# Patient Record
Sex: FEMALE | Race: BLACK OR AFRICAN AMERICAN
[De-identification: names, ages, dates, MRNs, and addresses within clinical notes are randomized per-mention and may not be internally consistent; named-entity substitution may affect disease eponyms.]

---

## 2018-02-26 ENCOUNTER — HOSPITAL ENCOUNTER (INPATIENT)
Dept: HOSPITAL 17 - NEDDLT | Age: 63
LOS: 3 days | Discharge: HOME | DRG: 369 | End: 2018-03-01
Attending: HOSPITALIST | Admitting: HOSPITALIST
Payer: COMMERCIAL

## 2018-02-26 VITALS
DIASTOLIC BLOOD PRESSURE: 85 MMHG | HEART RATE: 120 BPM | TEMPERATURE: 100.3 F | SYSTOLIC BLOOD PRESSURE: 129 MMHG | RESPIRATION RATE: 20 BRPM | OXYGEN SATURATION: 96 %

## 2018-02-26 VITALS — WEIGHT: 123.9 LBS | BODY MASS INDEX: 21.95 KG/M2 | HEIGHT: 63 IN

## 2018-02-26 VITALS — HEART RATE: 115 BPM

## 2018-02-26 DIAGNOSIS — D72.829: ICD-10-CM

## 2018-02-26 DIAGNOSIS — K29.80: ICD-10-CM

## 2018-02-26 DIAGNOSIS — D62: ICD-10-CM

## 2018-02-26 DIAGNOSIS — E87.6: ICD-10-CM

## 2018-02-26 DIAGNOSIS — E88.09: ICD-10-CM

## 2018-02-26 DIAGNOSIS — K52.9: ICD-10-CM

## 2018-02-26 DIAGNOSIS — R00.0: ICD-10-CM

## 2018-02-26 DIAGNOSIS — K29.50: ICD-10-CM

## 2018-02-26 DIAGNOSIS — R79.89: ICD-10-CM

## 2018-02-26 DIAGNOSIS — R15.2: ICD-10-CM

## 2018-02-26 DIAGNOSIS — K22.6: Primary | ICD-10-CM

## 2018-02-26 DIAGNOSIS — I95.9: ICD-10-CM

## 2018-02-26 DIAGNOSIS — K76.0: ICD-10-CM

## 2018-02-26 DIAGNOSIS — I10: ICD-10-CM

## 2018-02-26 DIAGNOSIS — F17.210: ICD-10-CM

## 2018-02-26 PROCEDURE — 82948 REAGENT STRIP/BLOOD GLUCOSE: CPT

## 2018-02-26 PROCEDURE — 85610 PROTHROMBIN TIME: CPT

## 2018-02-26 PROCEDURE — 85384 FIBRINOGEN ACTIVITY: CPT

## 2018-02-26 PROCEDURE — 87641 MR-STAPH DNA AMP PROBE: CPT

## 2018-02-26 PROCEDURE — 36430 TRANSFUSION BLD/BLD COMPNT: CPT

## 2018-02-26 PROCEDURE — 71045 X-RAY EXAM CHEST 1 VIEW: CPT

## 2018-02-26 PROCEDURE — 82550 ASSAY OF CK (CPK): CPT

## 2018-02-26 PROCEDURE — 85730 THROMBOPLASTIN TIME PARTIAL: CPT

## 2018-02-26 PROCEDURE — 85014 HEMATOCRIT: CPT

## 2018-02-26 PROCEDURE — 80053 COMPREHEN METABOLIC PANEL: CPT

## 2018-02-26 PROCEDURE — 85018 HEMOGLOBIN: CPT

## 2018-02-26 PROCEDURE — 85027 COMPLETE CBC AUTOMATED: CPT

## 2018-02-26 PROCEDURE — 86920 COMPATIBILITY TEST SPIN: CPT

## 2018-02-26 PROCEDURE — 84132 ASSAY OF SERUM POTASSIUM: CPT

## 2018-02-26 PROCEDURE — 83605 ASSAY OF LACTIC ACID: CPT

## 2018-02-26 PROCEDURE — 84100 ASSAY OF PHOSPHORUS: CPT

## 2018-02-26 PROCEDURE — C9113 INJ PANTOPRAZOLE SODIUM, VIA: HCPCS

## 2018-02-26 PROCEDURE — 88305 TISSUE EXAM BY PATHOLOGIST: CPT

## 2018-02-26 PROCEDURE — 85025 COMPLETE CBC W/AUTO DIFF WBC: CPT

## 2018-02-26 PROCEDURE — 80048 BASIC METABOLIC PNL TOTAL CA: CPT

## 2018-02-26 PROCEDURE — 88312 SPECIAL STAINS GROUP 1: CPT

## 2018-02-26 PROCEDURE — 83735 ASSAY OF MAGNESIUM: CPT

## 2018-02-26 PROCEDURE — P9016 RBC LEUKOCYTES REDUCED: HCPCS

## 2018-02-27 VITALS
TEMPERATURE: 99.3 F | SYSTOLIC BLOOD PRESSURE: 116 MMHG | HEART RATE: 86 BPM | DIASTOLIC BLOOD PRESSURE: 59 MMHG | RESPIRATION RATE: 20 BRPM | OXYGEN SATURATION: 96 %

## 2018-02-27 VITALS
SYSTOLIC BLOOD PRESSURE: 132 MMHG | OXYGEN SATURATION: 96 % | HEART RATE: 105 BPM | DIASTOLIC BLOOD PRESSURE: 81 MMHG | RESPIRATION RATE: 20 BRPM

## 2018-02-27 VITALS
HEART RATE: 112 BPM | OXYGEN SATURATION: 94 % | RESPIRATION RATE: 29 BRPM | DIASTOLIC BLOOD PRESSURE: 82 MMHG | SYSTOLIC BLOOD PRESSURE: 131 MMHG

## 2018-02-27 VITALS
DIASTOLIC BLOOD PRESSURE: 75 MMHG | RESPIRATION RATE: 13 BRPM | OXYGEN SATURATION: 97 % | HEART RATE: 101 BPM | SYSTOLIC BLOOD PRESSURE: 129 MMHG

## 2018-02-27 VITALS
SYSTOLIC BLOOD PRESSURE: 116 MMHG | OXYGEN SATURATION: 95 % | HEART RATE: 115 BPM | RESPIRATION RATE: 20 BRPM | DIASTOLIC BLOOD PRESSURE: 74 MMHG

## 2018-02-27 VITALS — HEART RATE: 93 BPM | OXYGEN SATURATION: 96 % | RESPIRATION RATE: 27 BRPM

## 2018-02-27 VITALS — RESPIRATION RATE: 12 BRPM | HEART RATE: 84 BPM | OXYGEN SATURATION: 97 %

## 2018-02-27 VITALS
DIASTOLIC BLOOD PRESSURE: 73 MMHG | HEART RATE: 109 BPM | RESPIRATION RATE: 21 BRPM | OXYGEN SATURATION: 97 % | SYSTOLIC BLOOD PRESSURE: 134 MMHG

## 2018-02-27 VITALS
OXYGEN SATURATION: 99 % | HEART RATE: 99 BPM | SYSTOLIC BLOOD PRESSURE: 125 MMHG | RESPIRATION RATE: 27 BRPM | DIASTOLIC BLOOD PRESSURE: 69 MMHG

## 2018-02-27 VITALS
HEART RATE: 87 BPM | SYSTOLIC BLOOD PRESSURE: 117 MMHG | OXYGEN SATURATION: 96 % | DIASTOLIC BLOOD PRESSURE: 60 MMHG | RESPIRATION RATE: 13 BRPM

## 2018-02-27 VITALS — HEART RATE: 94 BPM | RESPIRATION RATE: 54 BRPM | OXYGEN SATURATION: 95 %

## 2018-02-27 VITALS
SYSTOLIC BLOOD PRESSURE: 132 MMHG | DIASTOLIC BLOOD PRESSURE: 66 MMHG | HEART RATE: 107 BPM | OXYGEN SATURATION: 95 % | RESPIRATION RATE: 17 BRPM

## 2018-02-27 VITALS
DIASTOLIC BLOOD PRESSURE: 69 MMHG | RESPIRATION RATE: 19 BRPM | SYSTOLIC BLOOD PRESSURE: 145 MMHG | HEART RATE: 98 BPM | OXYGEN SATURATION: 95 %

## 2018-02-27 VITALS
DIASTOLIC BLOOD PRESSURE: 78 MMHG | RESPIRATION RATE: 27 BRPM | HEART RATE: 116 BPM | OXYGEN SATURATION: 100 % | SYSTOLIC BLOOD PRESSURE: 132 MMHG

## 2018-02-27 VITALS
DIASTOLIC BLOOD PRESSURE: 70 MMHG | SYSTOLIC BLOOD PRESSURE: 135 MMHG | HEART RATE: 103 BPM | OXYGEN SATURATION: 96 % | RESPIRATION RATE: 15 BRPM

## 2018-02-27 VITALS
DIASTOLIC BLOOD PRESSURE: 71 MMHG | RESPIRATION RATE: 19 BRPM | SYSTOLIC BLOOD PRESSURE: 125 MMHG | OXYGEN SATURATION: 98 % | HEART RATE: 95 BPM

## 2018-02-27 VITALS
DIASTOLIC BLOOD PRESSURE: 66 MMHG | HEART RATE: 100 BPM | RESPIRATION RATE: 19 BRPM | SYSTOLIC BLOOD PRESSURE: 123 MMHG | OXYGEN SATURATION: 96 %

## 2018-02-27 VITALS
HEART RATE: 109 BPM | RESPIRATION RATE: 16 BRPM | OXYGEN SATURATION: 95 % | DIASTOLIC BLOOD PRESSURE: 72 MMHG | SYSTOLIC BLOOD PRESSURE: 122 MMHG

## 2018-02-27 VITALS
SYSTOLIC BLOOD PRESSURE: 124 MMHG | OXYGEN SATURATION: 95 % | HEART RATE: 99 BPM | RESPIRATION RATE: 20 BRPM | DIASTOLIC BLOOD PRESSURE: 71 MMHG

## 2018-02-27 VITALS — HEART RATE: 100 BPM | OXYGEN SATURATION: 97 % | RESPIRATION RATE: 19 BRPM

## 2018-02-27 VITALS — RESPIRATION RATE: 14 BRPM | HEART RATE: 93 BPM | OXYGEN SATURATION: 96 %

## 2018-02-27 VITALS — RESPIRATION RATE: 19 BRPM | HEART RATE: 95 BPM | OXYGEN SATURATION: 97 %

## 2018-02-27 VITALS
OXYGEN SATURATION: 98 % | SYSTOLIC BLOOD PRESSURE: 112 MMHG | RESPIRATION RATE: 25 BRPM | HEART RATE: 82 BPM | DIASTOLIC BLOOD PRESSURE: 59 MMHG

## 2018-02-27 VITALS — HEART RATE: 86 BPM | DIASTOLIC BLOOD PRESSURE: 57 MMHG | SYSTOLIC BLOOD PRESSURE: 99 MMHG | RESPIRATION RATE: 30 BRPM

## 2018-02-27 VITALS
DIASTOLIC BLOOD PRESSURE: 67 MMHG | SYSTOLIC BLOOD PRESSURE: 128 MMHG | OXYGEN SATURATION: 96 % | HEART RATE: 102 BPM | RESPIRATION RATE: 27 BRPM

## 2018-02-27 VITALS — RESPIRATION RATE: 22 BRPM | OXYGEN SATURATION: 96 % | HEART RATE: 106 BPM

## 2018-02-27 VITALS
RESPIRATION RATE: 19 BRPM | HEART RATE: 79 BPM | SYSTOLIC BLOOD PRESSURE: 107 MMHG | DIASTOLIC BLOOD PRESSURE: 59 MMHG | OXYGEN SATURATION: 99 %

## 2018-02-27 VITALS — HEART RATE: 96 BPM | RESPIRATION RATE: 33 BRPM | OXYGEN SATURATION: 100 %

## 2018-02-27 VITALS
RESPIRATION RATE: 14 BRPM | OXYGEN SATURATION: 97 % | DIASTOLIC BLOOD PRESSURE: 69 MMHG | HEART RATE: 97 BPM | SYSTOLIC BLOOD PRESSURE: 133 MMHG

## 2018-02-27 VITALS — RESPIRATION RATE: 26 BRPM | HEART RATE: 96 BPM

## 2018-02-27 VITALS
RESPIRATION RATE: 17 BRPM | SYSTOLIC BLOOD PRESSURE: 153 MMHG | HEART RATE: 105 BPM | OXYGEN SATURATION: 95 % | DIASTOLIC BLOOD PRESSURE: 72 MMHG

## 2018-02-27 VITALS
DIASTOLIC BLOOD PRESSURE: 79 MMHG | OXYGEN SATURATION: 95 % | HEART RATE: 109 BPM | SYSTOLIC BLOOD PRESSURE: 123 MMHG | RESPIRATION RATE: 24 BRPM

## 2018-02-27 VITALS — OXYGEN SATURATION: 97 % | RESPIRATION RATE: 21 BRPM | HEART RATE: 99 BPM

## 2018-02-27 VITALS
DIASTOLIC BLOOD PRESSURE: 74 MMHG | HEART RATE: 109 BPM | OXYGEN SATURATION: 95 % | RESPIRATION RATE: 15 BRPM | SYSTOLIC BLOOD PRESSURE: 126 MMHG

## 2018-02-27 VITALS
SYSTOLIC BLOOD PRESSURE: 107 MMHG | DIASTOLIC BLOOD PRESSURE: 58 MMHG | RESPIRATION RATE: 33 BRPM | OXYGEN SATURATION: 99 % | HEART RATE: 92 BPM

## 2018-02-27 VITALS
RESPIRATION RATE: 25 BRPM | HEART RATE: 110 BPM | SYSTOLIC BLOOD PRESSURE: 132 MMHG | OXYGEN SATURATION: 94 % | DIASTOLIC BLOOD PRESSURE: 76 MMHG

## 2018-02-27 VITALS
SYSTOLIC BLOOD PRESSURE: 124 MMHG | OXYGEN SATURATION: 94 % | HEART RATE: 106 BPM | RESPIRATION RATE: 16 BRPM | DIASTOLIC BLOOD PRESSURE: 69 MMHG

## 2018-02-27 VITALS
SYSTOLIC BLOOD PRESSURE: 126 MMHG | OXYGEN SATURATION: 100 % | DIASTOLIC BLOOD PRESSURE: 73 MMHG | HEART RATE: 118 BPM | RESPIRATION RATE: 26 BRPM

## 2018-02-27 VITALS
RESPIRATION RATE: 15 BRPM | TEMPERATURE: 99.1 F | SYSTOLIC BLOOD PRESSURE: 137 MMHG | OXYGEN SATURATION: 93 % | DIASTOLIC BLOOD PRESSURE: 74 MMHG | HEART RATE: 107 BPM

## 2018-02-27 VITALS
DIASTOLIC BLOOD PRESSURE: 56 MMHG | OXYGEN SATURATION: 96 % | HEART RATE: 91 BPM | RESPIRATION RATE: 22 BRPM | TEMPERATURE: 99 F | SYSTOLIC BLOOD PRESSURE: 117 MMHG

## 2018-02-27 VITALS — HEART RATE: 117 BPM

## 2018-02-27 LAB
ALBUMIN SERPL-MCNC: 2.9 GM/DL (ref 3.4–5)
ALP SERPL-CCNC: 87 U/L (ref 45–117)
ALT SERPL-CCNC: 16 U/L (ref 10–53)
AST SERPL-CCNC: 20 U/L (ref 15–37)
BASOPHILS # BLD AUTO: 0.1 TH/MM3 (ref 0–0.2)
BASOPHILS NFR BLD: 0.4 % (ref 0–2)
BILIRUB SERPL-MCNC: 0.4 MG/DL (ref 0.2–1)
BUN SERPL-MCNC: 37 MG/DL (ref 7–18)
CALCIUM SERPL-MCNC: 7.7 MG/DL (ref 8.5–10.1)
CHLORIDE SERPL-SCNC: 111 MEQ/L (ref 98–107)
CREAT SERPL-MCNC: 0.87 MG/DL (ref 0.5–1)
EOSINOPHIL # BLD: 0 TH/MM3 (ref 0–0.4)
EOSINOPHIL NFR BLD: 0.1 % (ref 0–4)
ERYTHROCYTE [DISTWIDTH] IN BLOOD BY AUTOMATED COUNT: 12.6 % (ref 11.6–17.2)
ERYTHROCYTE [DISTWIDTH] IN BLOOD BY AUTOMATED COUNT: 13 % (ref 11.6–17.2)
FIBRINOGEN PPP-MCNC: 229 MG/DL (ref 227–377)
GFR SERPLBLD BASED ON 1.73 SQ M-ARVRAT: 80 ML/MIN (ref 89–?)
GLUCOSE SERPL-MCNC: 140 MG/DL (ref 74–106)
HCO3 BLD-SCNC: 23.1 MEQ/L (ref 21–32)
HCT VFR BLD CALC: 21.3 % (ref 35–46)
HCT VFR BLD CALC: 23.3 % (ref 35–46)
HCT VFR BLD CALC: 24.4 % (ref 35–46)
HCT VFR BLD CALC: 27.5 % (ref 35–46)
HGB BLD-MCNC: 7.3 GM/DL (ref 11.6–15.3)
HGB BLD-MCNC: 8 GM/DL (ref 11.6–15.3)
HGB BLD-MCNC: 8.5 GM/DL (ref 11.6–15.3)
HGB BLD-MCNC: 9.5 GM/DL (ref 11.6–15.3)
INR PPP: 1 RATIO
LYMPHOCYTES # BLD AUTO: 3.5 TH/MM3 (ref 1–4.8)
LYMPHOCYTES NFR BLD AUTO: 23.4 % (ref 9–44)
MAGNESIUM SERPL-MCNC: 1.6 MG/DL (ref 1.5–2.5)
MAGNESIUM SERPL-MCNC: 1.7 MG/DL (ref 1.5–2.5)
MCH RBC QN AUTO: 32.5 PG (ref 27–34)
MCH RBC QN AUTO: 32.8 PG (ref 27–34)
MCHC RBC AUTO-ENTMCNC: 34.6 % (ref 32–36)
MCHC RBC AUTO-ENTMCNC: 34.6 % (ref 32–36)
MCV RBC AUTO: 93.8 FL (ref 80–100)
MCV RBC AUTO: 94.7 FL (ref 80–100)
MONOCYTE #: 0.6 TH/MM3 (ref 0–0.9)
MONOCYTES NFR BLD: 4 % (ref 0–8)
NEUTROPHILS # BLD AUTO: 10.9 TH/MM3 (ref 1.8–7.7)
NEUTROPHILS NFR BLD AUTO: 72.1 % (ref 16–70)
PHOSPHATE SERPL-MCNC: 2.3 MG/DL (ref 2.5–4.9)
PHOSPHATE SERPL-MCNC: 2.9 MG/DL (ref 2.5–4.9)
PLATELET # BLD: 243 TH/MM3 (ref 150–450)
PLATELET # BLD: 283 TH/MM3 (ref 150–450)
PMV BLD AUTO: 9.3 FL (ref 7–11)
PMV BLD AUTO: 9.5 FL (ref 7–11)
PROT SERPL-MCNC: 6.1 GM/DL (ref 6.4–8.2)
PROTHROMBIN TIME: 10.5 SEC (ref 9.8–11.6)
RBC # BLD AUTO: 2.58 MIL/MM3 (ref 4–5.3)
RBC # BLD AUTO: 2.93 MIL/MM3 (ref 4–5.3)
SODIUM SERPL-SCNC: 145 MEQ/L (ref 136–145)
WBC # BLD AUTO: 12 TH/MM3 (ref 4–11)
WBC # BLD AUTO: 15.1 TH/MM3 (ref 4–11)

## 2018-02-27 PROCEDURE — 0W3P8ZZ CONTROL BLEEDING IN GASTROINTESTINAL TRACT, VIA NATURAL OR ARTIFICIAL OPENING ENDOSCOPIC: ICD-10-PCS | Performed by: INTERNAL MEDICINE

## 2018-02-27 PROCEDURE — 0DB78ZX EXCISION OF STOMACH, PYLORUS, VIA NATURAL OR ARTIFICIAL OPENING ENDOSCOPIC, DIAGNOSTIC: ICD-10-PCS | Performed by: INTERNAL MEDICINE

## 2018-02-27 RX ADMIN — PANTOPRAZOLE SODIUM SCH MLS/HR: 40 INJECTION, POWDER, FOR SOLUTION INTRAVENOUS at 09:13

## 2018-02-27 RX ADMIN — Medication SCH ML: at 20:02

## 2018-02-27 RX ADMIN — CHLORHEXIDINE GLUCONATE SCH PACK: 500 CLOTH TOPICAL at 01:52

## 2018-02-27 RX ADMIN — STANDARDIZED SENNA CONCENTRATE AND DOCUSATE SODIUM SCH TAB: 8.6; 5 TABLET, FILM COATED ORAL at 09:00

## 2018-02-27 RX ADMIN — POTASSIUM CHLORIDE SCH MLS/HR: 10 INJECTION, SOLUTION INTRAVENOUS at 15:02

## 2018-02-27 RX ADMIN — PHENYTOIN SODIUM SCH MLS/HR: 50 INJECTION INTRAMUSCULAR; INTRAVENOUS at 01:51

## 2018-02-27 RX ADMIN — HUMAN INSULIN SCH: 100 INJECTION, SOLUTION SUBCUTANEOUS at 12:00

## 2018-02-27 RX ADMIN — MAGNESIUM SULFATE IN DEXTROSE SCH MLS/HR: 10 INJECTION, SOLUTION INTRAVENOUS at 17:13

## 2018-02-27 RX ADMIN — PHENYTOIN SODIUM SCH MLS/HR: 50 INJECTION INTRAMUSCULAR; INTRAVENOUS at 22:09

## 2018-02-27 RX ADMIN — PANTOPRAZOLE SODIUM SCH MLS/HR: 40 INJECTION, POWDER, FOR SOLUTION INTRAVENOUS at 20:02

## 2018-02-27 RX ADMIN — MAGNESIUM SULFATE IN DEXTROSE SCH MLS/HR: 10 INJECTION, SOLUTION INTRAVENOUS at 09:13

## 2018-02-27 RX ADMIN — HUMAN INSULIN SCH: 100 INJECTION, SOLUTION SUBCUTANEOUS at 20:02

## 2018-02-27 RX ADMIN — HUMAN INSULIN SCH: 100 INJECTION, SOLUTION SUBCUTANEOUS at 17:00

## 2018-02-27 RX ADMIN — PHENYTOIN SODIUM SCH MLS/HR: 50 INJECTION INTRAMUSCULAR; INTRAVENOUS at 09:13

## 2018-02-27 RX ADMIN — Medication SCH ML: at 09:15

## 2018-02-27 RX ADMIN — STANDARDIZED SENNA CONCENTRATE AND DOCUSATE SODIUM SCH TAB: 8.6; 5 TABLET, FILM COATED ORAL at 20:02

## 2018-02-27 RX ADMIN — POTASSIUM CHLORIDE SCH MLS/HR: 10 INJECTION, SOLUTION INTRAVENOUS at 09:14

## 2018-02-27 RX ADMIN — CHLORHEXIDINE GLUCONATE SCH PACK: 500 CLOTH TOPICAL at 22:10

## 2018-02-27 RX ADMIN — POTASSIUM CHLORIDE SCH MLS/HR: 10 INJECTION, SOLUTION INTRAVENOUS at 13:18

## 2018-02-27 RX ADMIN — PHENYTOIN SODIUM SCH MLS/HR: 50 INJECTION INTRAMUSCULAR; INTRAVENOUS at 14:31

## 2018-02-27 NOTE — HHI.HP
HPI


Service


Critical Care Medicine


Primary Care Physician


No Primary Care Physician


Admission Diagnosis





Diagnosis:  


Travel History


International Travel<30 Days:  No


Contact w/Intl Traveler <30 Da:  No


Traveled to Known Affected Are:  No


History of Present Illness


62-year-old female with a history of hypertension and one episode of presumed 

upper GI bleed noted as hematemesis approximately years ago that was self-

limited, patient did not seek medical attention for, presents with having 

vomiting copious amounts of dark material. The patient surmises as blood, 

similar to previous episode noted above.  At presentation at the [facility] 

emergency department, patient was tachycardic with hypotension in triage.  

During my assessment in the ICU the patient still remained slightly tachycardic 

but normotensive with map at 100s .  The patient denies having chest pain, 

shortness of breath, abdominal pain, having any melena or hematochezia 

recently.  Patient denies fever chills sweats, recent antibiotic use, or any 

recent illnesses.  Patient denies any history of any bleeding dyscrasias or 

family history of same.  Patient does not take any blood thinners does not use 

NSAIDs on a regular basis.  Patient states she infrequently has 1 drink of 

alcohol.





Review of Systems


Constitutional:  DENIES: Diaphoretic episodes, Fatigue, Fever, Weight gain, 

Weight loss, Chills, Dizziness, Change in appetite, Night Sweats


Endocrine:  DENIES: Abnorml menstrual pattern, Heat/cold intolerance, Polydipsia

, Polyuria, Polyphagia


Eyes:  DENIES: Blurred vision, Diplopia, Eye inflammation, Eye pain, Vision loss

, Photosensitivity, Double Vision


Ears, nose, mouth, throat:  DENIES: Tinnitus, Hearing loss, Vertigo, Nasal 

discharge, Oral lesions, Throat pain, Hoarseness, Ear Pain, Running Nose, 

Epistaxis, Sinus Pain, Toothache, Odynophagia


Respiratory:  DENIES: Apneas, Cough, Snoring, Wheezing, Hemoptysis, Sputum 

production, Shortness of breath


Cardiovascular:  DENIES: Chest pain, Palpitations, Syncope, Dyspnea on Exertion

, PND, Lower Extremity Edema, Orthopnea, Claudication


Gastrointestinal:  COMPLAINS OF: Abdominal pain, Nausea, Vomiting, DENIES: 

Black stools, Bloody stools, Constipation, Diarrhea, Difficulty Swallowing, 

Anorexia


Genitourinary:  DENIES: Abnormal vaginal bleeding, Dysmenorrhea, Dyspareunia, 

Sexual dysfunction, Urinary frequency, Urinary incontinence, Urgency, Hematuria

, Dysuria, Nocturia, Vaginal discharge


Musculoskeletal:  DENIES: Joint pain, Muscle aches, Stiffness, Joint Swelling, 

Back pain, Neck pain


Integumentary:  DENIES: Abnormal pigmentation, Pruritus, Rash, Nail changes, 

Breast masses, Breast skin changes, Nipple discharge


Hematologic/lymphatic:  DENIES: Bruising, Lymphadenopathy


Immunologic/allergic:  DENIES: Eczema, Urticaria


Neurologic:  DENIES: Abnormal gait, Headache, Localized weakness, Paresthesias, 

Seizures, Speech Problems, Tremor, Poor Balance


Psychiatric:  DENIES: Anxiety, Confusion, Mood changes, Depression, 

Hallucinations, Agitation, Suicidal Ideation, Homicidal Ideation, Delusions





Past Family Social History


Allergies:  


Coded Allergies:  


     No Known Drug Allergies (Verified  Allergy, Unknown, 18)


Past Medical History


Hypertension:  Yes


Menopausal:  Yes


Past Surgical History


Tubal Ligation:  Yes


Reported Medications





Reported Meds & Active Scripts


Active


Reported


[BP MED x2]


Active Ordered Medications





Current Medications








 Medications


  (Trade)  Dose


 Ordered  Sig/Rishi


 Route


 PRN Reason  Start Time


 Stop Time Status Last Admin


Dose Admin


 


 Sodium Chloride  1,000 ml @ 


 184 mls/hr  Q5H27M


 IV


   18 00:48


    18 01:51


 


 


 Sodium Chloride


  (NS Flush)  2 ml  UNSCH  PRN


 IV FLUSH


 FLUSH AFTER USING IV ACCESS  18 01:00


     


 


 


 Sodium Chloride


  (NS Flush)  2 ml  BID


 IV FLUSH


   18 09:00


     


 


 


 Acetaminophen


  (Tylenol)  650 mg  Q6H  PRN


 PO


 PAIN 1-5 AND/OR FEVER >101F  18 01:00


     


 


 


 Hydromorphone HCl


  (Dilaudid Pf Inj)  1 mg  Q4H  PRN


 IV


 PAIN SCALE 6 TO 10  18 01:00


     


 


 


 Pantoprazole


 Sodium


  (Protonix)  40 mg  Q12HR


 PO


   18 01:00


    18 01:51


 


 


 Ondansetron HCl


  (Zofran Inj)  4 mg  Q6H  PRN


 IV PUSH


 NAUSEA OR VOMITING  18 01:00


     


 


 


 Temazepam


  (Restoril)  15 mg  HS  PRN


 PO


 INSOMNIA  18 01:00


     


 


 


 Albuterol/


 Ipratropium


  (Duoneb Neb)  1 ampule  Q2HR NEB  PRN


 INH


 WHEEZING  18 01:00


     


 


 


 Miscellaneous


 Information  1  Q361D


 XX


   18 01:00


    18 01:00


 


 


 Chlorhexidine


 Gluconate


  (Chlorhexidine


 2% Cloth)  3 pack


 Taper  DAILY@04


 TOP


   18 04:00


 19 03:59  18 01:52


 


 


 Chlorhexidine


 Gluconate


  (Chlorhexidine


 2% Cloth)  3 pack  UNSCH  PRN


 TOP


 HYGIENIC CARE  18 01:00


     


 


 


 Senna/Docusate


 Sodium


  (Carla-Colace)  1 tab  BID


 PO


   18 09:00


     


 


 


 Magnesium


 Hydroxide


  (Milk Of


 Magnesia Liq)  30 ml  Q12H  PRN


 PO


 Mild constipation  18 01:00


     


 


 


 Sennosides


  (Senokot)  17.2 mg  Q12H  PRN


 PO


 Moderate constipation  18 01:00


     


 


 


 Bisacodyl


  (Dulcolax Supp)  10 mg  DAILY  PRN


 RECTAL


 SEVERE CONSITIPATION  18 01:00


     


 


 


 Lactulose


  (Lactulose Liq)  30 ml  DAILY  PRN


 PO


 SEVERE CONSITIPATION  18 01:00


     


 








Family History


No family history significant of malignancy


Social History


Alcohol Use:  Yes (OCCATIONALLY)


Tobacco Use:  Yes (<1PPD)


Substance Use: Yes, cocaine occasionally





Physical Exam


Vital Signs





Vital Signs








  Date Time  Temp Pulse Resp B/P (MAP) Pulse Ox O2 Delivery O2 Flow Rate FiO2


 


18 23:15  115      


 


18 22:50 100.3 120 20 129/85 (100) 96   








Physical Exam


GENERAL: Well-nourished, well-developed patient.


SKIN: Warm and dry.


HEAD: Normocephalic.


EYES: No scleral icterus. No injection or drainage. 


NECK: Supple, trachea midline. No JVD or lymphadenopathy.


CARDIOVASCULAR: Regular rate and rhythm without murmurs, gallops, or rubs. 


RESPIRATORY: Breath sounds equal bilaterally. No accessory muscle use.


GASTROINTESTINAL: Abdomen soft, non-tender, nondistended. 


MUSCULOSKELETAL: No cyanosis, or edema. 


BACK: Nontender without obvious deformity. 


NEURO EXAM:


GCS: 15


Mental Status: The patient is alert and oriented to person, place, and time 

with normal speech.





Septic Shock Reassessment


Septic shock perfusion:  reassessment completed





Caprini VTE Risk Assessment


Caprini VTE Risk Assessment:  Mod/High Risk (score >= 2)


VTE Pharm Contraindication:  Hemorrhage


Caprini Risk Assessment Model











 Point Value = 1          Point Value = 2  Point Value = 3  Point Value = 5


 


Age 41-60


Minor surgery


BMI > 25 kg/m2


Swollen legs


Varicose veins


Pregnancy or postpartum


History of unexplained or recurrent


   spontaneous 


Oral contraceptives or hormone


   replacement


Sepsis (< 1 month)


Serious lung disease, including


   pneumonia (< 1 month)


Abnormal pulmonary function


Acute myocardial infarction


Congestive heart failure (< 1 month)


History of inflammatory bowel disease


Medical patient at bed rest Age 61-74


Arthroscopic surgery


Major open surgery (> 45 min)


Laparoscopic surgery (> 45 min)


Malignancy


Confined to bed (> 72 hours)


Immobilizing plaster cast


Central venous access Age >= 75


History of VTE


Family history of VTE


Factor V Leiden


Prothrombin 73043W


Lupus anticoagulant


Anticardiolipin antibodies


Elevated serum homocysteine


Heparin-induced thrombocytopenia


Other congenital or acquired


   thrombophilia Stroke (< 1 month)


Elective arthroplasty


Hip, pelvis, or leg fracture


Acute spinal cord injury (< 1 month)








Prophylaxis Regimen











   Total Risk


Factor Score Risk Level Prophylaxis Regimen


 


0-1      Low Early ambulation


 


2 Moderate Order ONE of the following:


*Sequential Compression Device (SCD)


*Heparin 5000 units SQ BID


 


3-4 Higher Order ONE of the following medications:


*Heparin 5000 units SQ TID


*Enoxaparin/Lovenox 40 mg SQ daily (WT < 150 kg, CrCl > 30 mL/min)


*Enoxaparin/Lovenox 30 mg SQ daily (WT < 150 kg, CrCl > 10-29 mL/min)


*Enoxaparin/Lovenox 30 mg SQ BID (WT < 150 kg, CrCl > 30 mL/min)


AND/OR


*Sequential Compression Device (SCD)


 


5 or more Highest Order ONE of the following medications:


*Heparin 5000 units SQ TID (Preferred with Epidurals)


*Enoxaparin/Lovenox 40 mg SQ daily (WT < 150 kg, CrCl > 30 mL/min)


*Enoxaparin/Lovenox 30 mg SQ daily (WT < 150 kg, CrCl > 10-29 mL/min)


*Enoxaparin/Lovenox 30 mg SQ BID (WT < 150 kg, CrCl > 30 mL/min)


AND


*Sequential Compression Device (SCD)











Assessment and Plan


Assessment and Plan


Hematemesis


- Monitor H&H


- Protonix 40 IV twice a day


- Gastroenterology consultation


- IV fluids resuscitation


- ICU and telemetry





Hypotension


- Aggressive IV fluids resuscitation





Tachycardia


- Positive for cocaine


- Telemetry and supportive care





DVT GI prophylaxis


- Teds SCDs


- No pharmacological DVT prophylaxis due to upper GI bleed


- Protonix IV twice a day





Critical Care:


The total critical care time was 35 minutes. Time to perform other separately 

billable procedures was not included in the critical care time.











Jaden Parada MD 2018 12:57 am

## 2018-02-27 NOTE — RADRPT
EXAM DATE/TIME:  02/27/2018 09:23 

 

[facility] COMPARISON:     

No previous studies available for comparison.

 

                     

INDICATIONS :     

Hematemesis.

                     

 

MEDICAL HISTORY :     

Hypertension.          

 

SURGICAL HISTORY :     

Tubal ligation.   

 

ENCOUNTER:     

Initial                                        

 

ACUITY:     

1 day      

 

PAIN SCORE:     

0/10

 

LOCATION:     

Bilateral chest 

 

FINDINGS:     

Portable AP view of the chest demonstrates a normal-sized cardiac silhouette. Nasogastric tube tip is
 in the stomach. Lungs are underinflated with mild atelectasis at the bases. No effusion, consolidati
on, or pneumothorax is identified. Bones and soft tissues demonstrate no acute finding.

 

CONCLUSION:     

No acute cardiopulmonary abnormality is identified.

 

 

 

 Daniel Bee MD on February 27, 2018 at 9:58           

Board Certified Radiologist.

 This report was verified electronically.
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
## 2018-02-27 NOTE — HHI.CCPN
Subjective


Remarks/Hospital Course


62-year-old female with a history of hypertension and one episode of presumed 

upper GI bleed noted as hematemesis approximately years ago that was self-

limited, patient did not seek medical attention for, presents with having 

vomiting copious amounts of dark material. The patient surmises as blood, 

similar to previous episode noted above.  At presentation at the [facility] 

emergency department, patient was tachycardic with hypotension in triage.  

During my assessment in the ICU the patient still remained slightly tachycardic 

but normotensive with map at 100s .  The patient denies having chest pain, 

shortness of breath, abdominal pain, having any melena or hematochezia 

recently.  Patient denies fever chills sweats, recent antibiotic use, or any 

recent illnesses.  Patient denies any history of any bleeding dyscrasias or 

family history of same.  Patient does not take any blood thinners does not use 

NSAIDs on a regular basis.  Patient states she infrequently has 1 drink of 

alcohol.





Subjective





2/27: Afebrile.  Denies abdominal pain.  No further episodes of hematemesis.  

Currently n.p.o. for possible EGD today.





Objective





Vital Signs








  Date Time  Temp Pulse Resp B/P (MAP) Pulse Ox O2 Delivery O2 Flow Rate FiO2


 


2/27/18 06:00  108      


 


2/27/18 06:00   22  96   


 


2/27/18 05:40    128/67 (87)    


 


2/27/18 04:00 99.1       














Intake and Output   


 


 2/27/18 2/27/18 2/28/18





 08:00 16:00 00:00


 


Intake Total 30 ml  


 


Balance 30 ml  








Result Diagram:  


2/27/18 0144                                                                   

             2/27/18 0144





Objective Remarks


GENERAL: 62-year-old AA female currently resting in bed in no acute distress


SKIN: Warm and dry.


HEAD: Atraumatic. Normocephalic. 


EYES: Pupils equal and round about 3 mm bilaterally and reactive. No scleral 

icterus. No injection or drainage. 


ENT: No nasal bleeding or discharge.  Mucous membranes pink and moist.  NG tube 

in left nares.


NECK: Trachea midline. No JVD. 


CARDIOVASCULAR: Tachycardic, RR.  S1, S2.  No S4.  No murmur


RESPIRATORY: Clear to auscultation bilaterally without wheezes rales or rhonchi


GASTROINTESTINAL: Abdomen soft, non-tender, nondistended.  Hypoactive bowel 

sounds are appreciated. 


MUSCULOSKELETAL: Extremities without any significant peripheral edema. No 

obvious deformities. 


NEUROLOGICAL: Awake and alert. No obvious cranial nerve deficits.  Motor 

grossly within normal limits. Five out of 5 muscle strength in the arms and 

legs.  Normal speech.


PSYCHIATRIC: Appropriate mood and affect; insight and judgment normal.


Urinary Catheter:  No


Assessment to:  Continue


Vascular Central Line Catheter:  No


Assessment to:  Continue





A/P


Assessment and Plan


Neuro/Psych:





UDS + cocaine 2/26/18


 


Ofirmev 1 g IV every 8 hours as needed fever/pain 1 through 5


Hydromorphone 1 mg IV every 4 hours as needed pain 6-10


Denies EtOH use





CV: 





Sinus tachycardia


History of hypertension





Avoid beta-blockers acutely -UDS positive for cocaine


Currently on normal saline at 100 cc an hour


Not requiring vasopressors and/or antihypertensives currently


Follow-up on EKG





Resp: 





Nasal cannula to make to maintain saturations greater than or equal to 92%.  

Currently in room air


Incentives parameter while awake


Check chest x-ray





GI: 





Hematemesis


Hypoalbuminemia 





Currently n.p.o.


Pantoprazole drip currently at 8 mg an hour for GI prophylaxis he received 80 

mg bolus 1


Currently docusate sodium/senna 1 tablet twice daily for bowel regimen





:  





No indication for Guidry catheter





Endo:  





Sliding scale insulin Novulin R low regimen with Accu-Cheks to maintain 

euglycemia





Renal: 





Prerenal azotemia





Aggressive hydration completed


Creatinine currently within normal limits


Monitor urine output


Accurate I's and O's





Heme:





Leukocytosis


Normocytic anemia





Monitor CBC daily.  Follow trends.


Serial hemoglobins every 6 hours


PTT normal, PTT 19 on admission





ID:





Monitor for infection





FEN:





Hypopotassemia





Replace electrolytes as clinically indicated per ICU electrolyte protocol


Recheck potassium at 1200


2 g mag sulfate IV 1 now





MSK:





PT evaluate and treat





Access


-Utilized peripheral IV.  Central and if indicated





Prophylaxis


-GI -pantoprazole drip


-DVT -SCD/pharmacological prophylaxis contraindicated with possible upper GI 

bleed/acute





15 additional minutes noncritical care time spent evaluating patient discussing 

with  at bedside 





Noted EGD revealed Cassandra-Miller tear/gastritis.  Okay to start clear liquid 

diet at 8 PM tonight if no further episodes.  Pantoprazole 40 mg p.o. daily.  

Transfer care to [facility] in a.m. 2/28.











Toan Eastman MD Feb 27, 2018 08:15

## 2018-02-27 NOTE — PD.PROCEDR
GI Procedure





PROCEDURE PERFORMED


upper endoscopy with bleeding control, biopsy





INDICATION FOR PROCEDURE


Hematemesis





PROCEDURE:


The procedure, risks and benefits were discussed with Ms. Clark and informed


consent was obtained.  Anesthesia sedated her with Diprivan.  She was placed in 

the left lateral decubitus position.





EGD:


The Pentax videoscope was introduced through the oropharynx and advanced to the 

second portion of the duodenum under direct visualization. Retroflexion was 

performed in the stomach.  There was a large Cassandra-Miller tear in the EG 

junction with clot attached to it, this was cauterized to control any leading, 

patient also has severe gastritis biopsy from the antrum to rule out H. pylori, 

patient also has duodenitis





ESTIMATED BLOOD LOSS:


None





SPECIMENS REMOVED:


Antrum





COMPLICATIONS:


None





IMPRESSION:


There was a large Cassandra-Miller tear in the EG junction with clot attached to it

, this was cauterized to control any leading, 


patient also has severe gastritis biopsy from the antrum to rule out H. pylori, 

patient also has duodenitis








PLAN:


No NSAID


Protonix 40 mg daily


No alcohol


Follow up biopsy


Nothing by mouth for 8 hours if no active bleeding then start clear liquid











Ursula Awad MD Feb 27, 2018 12:20

## 2018-02-27 NOTE — MB
cc:

Ursula Awad MD

****

 

 

DATE OF CONSULT:

 

REASON FOR REFERRAL:

Nausea, vomiting, hematemesis. Thank you for this consultation.

 

HISTORY OF PRESENT ILLNESS:

This a 62 year old lady who came because of few episodes of 

hematemesis, coffee ground, she states that it has happened twice, a 

small amount, she vomited dark material and blood. She was worried 

about  that. She went to the emergency room, found to have blood 

pressure that is low even though she has history of hypertension. Then

the patient was admitted because of tachycardia and low blood pressure

with active bleeding. The patient drinks occasionally but she drank in

the last few days. She denies any chest pain, no shortness of breath. 

 No history of GI symptoms, no colonoscopy.

 

REVIEW OF SYSTEMS:

Currently all 12 points negative except for HPI.

 

PAST MEDICAL HISTORY:

Significant for hypertension.

Tubal ligation.

 

ALLERGIES:

No known drug allergies.

 

MEDICATIONS:

Reviewed in her chart. No blood thinners.

 

SOCIAL HISTORY:

Positive for tobacco, drugs, alcohol and including cocaine.

 

PHYSICAL EXAMINATION:

IN GENERAL: Alert, oriented, no acute distress.

VITAL SIGNS: Stable.

HEENT: Pupils are round, reactive to light.

NECK:  The neck is supple.

CHEST: Clear to auscultation and percussion.

CARDIAC:  Regular rhythm.

ABDOMEN: Soft, nondistended.

EXTREMITIES: No edema, clubbing or cyanosis.

NEUROLOGICAL: Intact with good mental status and no focal 

abnormalities.

PSYCHOLOGIC: Psychologically appropriate.

 

LABORATORY FINDINGS:

White count 12, down from 15.1. Hemoglobin 8.5, Platelet 2423, INR 

1.0. Liver function tests are normal. Albumin 2.9.

 

CHEST X-RAY:

Negative.

 

ASSESSMENT/PLAN:

1.  A 62 year old lady with hematemesis and anemia.  She also had 

tachycardia, the patient will need an upper endoscopy on an urgent 

basis. We will plan on doing this today.

2.  We will continue monitoring her hemoglobin.

3.  Further plan depends on the endoscopy.

4.  The patient will need colonoscopy as an outpatient.

 

 

__________________________________

Ursula Awad MD

 

 

/DRAGAN

D: 02/27/2018, 12:25 PM

T: 02/27/2018, 03:02 PM

Visit #: [id number]

Job #: [id number]

## 2018-02-28 VITALS
HEART RATE: 117 BPM | OXYGEN SATURATION: 97 % | DIASTOLIC BLOOD PRESSURE: 89 MMHG | RESPIRATION RATE: 21 BRPM | TEMPERATURE: 99.4 F | SYSTOLIC BLOOD PRESSURE: 169 MMHG

## 2018-02-28 VITALS
OXYGEN SATURATION: 100 % | HEART RATE: 80 BPM | DIASTOLIC BLOOD PRESSURE: 58 MMHG | TEMPERATURE: 98.9 F | SYSTOLIC BLOOD PRESSURE: 106 MMHG | RESPIRATION RATE: 17 BRPM

## 2018-02-28 VITALS
TEMPERATURE: 98.9 F | RESPIRATION RATE: 17 BRPM | DIASTOLIC BLOOD PRESSURE: 55 MMHG | SYSTOLIC BLOOD PRESSURE: 110 MMHG | HEART RATE: 79 BPM

## 2018-02-28 VITALS
HEART RATE: 78 BPM | DIASTOLIC BLOOD PRESSURE: 59 MMHG | OXYGEN SATURATION: 98 % | RESPIRATION RATE: 13 BRPM | SYSTOLIC BLOOD PRESSURE: 109 MMHG | TEMPERATURE: 98.6 F

## 2018-02-28 VITALS — RESPIRATION RATE: 12 BRPM | HEART RATE: 79 BPM | SYSTOLIC BLOOD PRESSURE: 125 MMHG | DIASTOLIC BLOOD PRESSURE: 65 MMHG

## 2018-02-28 VITALS
SYSTOLIC BLOOD PRESSURE: 125 MMHG | OXYGEN SATURATION: 100 % | RESPIRATION RATE: 14 BRPM | DIASTOLIC BLOOD PRESSURE: 60 MMHG | HEART RATE: 74 BPM

## 2018-02-28 VITALS
SYSTOLIC BLOOD PRESSURE: 126 MMHG | RESPIRATION RATE: 13 BRPM | OXYGEN SATURATION: 97 % | DIASTOLIC BLOOD PRESSURE: 60 MMHG | HEART RATE: 80 BPM | TEMPERATURE: 98.5 F

## 2018-02-28 VITALS
DIASTOLIC BLOOD PRESSURE: 60 MMHG | TEMPERATURE: 99.4 F | HEART RATE: 78 BPM | SYSTOLIC BLOOD PRESSURE: 127 MMHG | RESPIRATION RATE: 21 BRPM | OXYGEN SATURATION: 97 %

## 2018-02-28 VITALS
RESPIRATION RATE: 15 BRPM | SYSTOLIC BLOOD PRESSURE: 121 MMHG | HEART RATE: 73 BPM | DIASTOLIC BLOOD PRESSURE: 56 MMHG | OXYGEN SATURATION: 98 %

## 2018-02-28 VITALS
DIASTOLIC BLOOD PRESSURE: 63 MMHG | HEART RATE: 82 BPM | OXYGEN SATURATION: 97 % | RESPIRATION RATE: 20 BRPM | SYSTOLIC BLOOD PRESSURE: 131 MMHG | TEMPERATURE: 98.6 F

## 2018-02-28 VITALS
SYSTOLIC BLOOD PRESSURE: 124 MMHG | HEART RATE: 78 BPM | DIASTOLIC BLOOD PRESSURE: 55 MMHG | RESPIRATION RATE: 16 BRPM | OXYGEN SATURATION: 99 %

## 2018-02-28 VITALS
RESPIRATION RATE: 19 BRPM | TEMPERATURE: 98.8 F | SYSTOLIC BLOOD PRESSURE: 127 MMHG | OXYGEN SATURATION: 98 % | DIASTOLIC BLOOD PRESSURE: 60 MMHG | HEART RATE: 77 BPM

## 2018-02-28 VITALS
OXYGEN SATURATION: 96 % | DIASTOLIC BLOOD PRESSURE: 56 MMHG | SYSTOLIC BLOOD PRESSURE: 110 MMHG | RESPIRATION RATE: 14 BRPM | HEART RATE: 81 BPM

## 2018-02-28 VITALS
DIASTOLIC BLOOD PRESSURE: 58 MMHG | HEART RATE: 73 BPM | RESPIRATION RATE: 12 BRPM | SYSTOLIC BLOOD PRESSURE: 124 MMHG | OXYGEN SATURATION: 100 %

## 2018-02-28 VITALS
HEART RATE: 78 BPM | TEMPERATURE: 99.4 F | SYSTOLIC BLOOD PRESSURE: 169 MMHG | DIASTOLIC BLOOD PRESSURE: 89 MMHG | RESPIRATION RATE: 21 BRPM | OXYGEN SATURATION: 97 %

## 2018-02-28 VITALS
RESPIRATION RATE: 21 BRPM | OXYGEN SATURATION: 97 % | SYSTOLIC BLOOD PRESSURE: 169 MMHG | TEMPERATURE: 99.4 F | DIASTOLIC BLOOD PRESSURE: 89 MMHG | HEART RATE: 117 BPM

## 2018-02-28 VITALS
RESPIRATION RATE: 12 BRPM | SYSTOLIC BLOOD PRESSURE: 110 MMHG | TEMPERATURE: 98.3 F | DIASTOLIC BLOOD PRESSURE: 56 MMHG | OXYGEN SATURATION: 80 % | HEART RATE: 84 BPM

## 2018-02-28 VITALS
SYSTOLIC BLOOD PRESSURE: 121 MMHG | HEART RATE: 86 BPM | OXYGEN SATURATION: 100 % | DIASTOLIC BLOOD PRESSURE: 61 MMHG | RESPIRATION RATE: 14 BRPM

## 2018-02-28 VITALS — OXYGEN SATURATION: 99 % | HEART RATE: 81 BPM | RESPIRATION RATE: 19 BRPM

## 2018-02-28 VITALS — SYSTOLIC BLOOD PRESSURE: 122 MMHG | RESPIRATION RATE: 14 BRPM | DIASTOLIC BLOOD PRESSURE: 57 MMHG | HEART RATE: 73 BPM

## 2018-02-28 VITALS — RESPIRATION RATE: 12 BRPM | HEART RATE: 78 BPM

## 2018-02-28 VITALS — SYSTOLIC BLOOD PRESSURE: 115 MMHG | HEART RATE: 78 BPM | DIASTOLIC BLOOD PRESSURE: 63 MMHG | RESPIRATION RATE: 30 BRPM

## 2018-02-28 VITALS — RESPIRATION RATE: 13 BRPM | HEART RATE: 77 BPM | OXYGEN SATURATION: 97 %

## 2018-02-28 LAB
ALBUMIN SERPL-MCNC: 2.7 GM/DL (ref 3.4–5)
ALP SERPL-CCNC: 71 U/L (ref 45–117)
ALT SERPL-CCNC: 15 U/L (ref 10–53)
AST SERPL-CCNC: 27 U/L (ref 15–37)
BASOPHILS # BLD AUTO: 0.1 TH/MM3 (ref 0–0.2)
BASOPHILS NFR BLD: 0.6 % (ref 0–2)
BILIRUB SERPL-MCNC: 0.4 MG/DL (ref 0.2–1)
BUN SERPL-MCNC: 8 MG/DL (ref 7–18)
CALCIUM SERPL-MCNC: 7.7 MG/DL (ref 8.5–10.1)
CHLORIDE SERPL-SCNC: 116 MEQ/L (ref 98–107)
CREAT SERPL-MCNC: 0.55 MG/DL (ref 0.5–1)
EOSINOPHIL # BLD: 0.3 TH/MM3 (ref 0–0.4)
EOSINOPHIL NFR BLD: 2.5 % (ref 0–4)
ERYTHROCYTE [DISTWIDTH] IN BLOOD BY AUTOMATED COUNT: 15.1 % (ref 11.6–17.2)
GFR SERPLBLD BASED ON 1.73 SQ M-ARVRAT: 136 ML/MIN (ref 89–?)
GLUCOSE SERPL-MCNC: 82 MG/DL (ref 74–106)
HCO3 BLD-SCNC: 23.5 MEQ/L (ref 21–32)
HCT VFR BLD CALC: 20.6 % (ref 35–46)
HCT VFR BLD CALC: 26.1 % (ref 35–46)
HGB BLD-MCNC: 7 GM/DL (ref 11.6–15.3)
HGB BLD-MCNC: 9.2 GM/DL (ref 11.6–15.3)
INR PPP: 1 RATIO
LYMPHOCYTES # BLD AUTO: 3.7 TH/MM3 (ref 1–4.8)
LYMPHOCYTES NFR BLD AUTO: 33.9 % (ref 9–44)
MAGNESIUM SERPL-MCNC: 1.9 MG/DL (ref 1.5–2.5)
MCH RBC QN AUTO: 32.6 PG (ref 27–34)
MCHC RBC AUTO-ENTMCNC: 35.2 % (ref 32–36)
MCV RBC AUTO: 92.5 FL (ref 80–100)
MONOCYTE #: 0.6 TH/MM3 (ref 0–0.9)
MONOCYTES NFR BLD: 5.5 % (ref 0–8)
NEUTROPHILS # BLD AUTO: 6.2 TH/MM3 (ref 1.8–7.7)
NEUTROPHILS NFR BLD AUTO: 57.5 % (ref 16–70)
PHOSPHATE SERPL-MCNC: 3.3 MG/DL (ref 2.5–4.9)
PLATELET # BLD: 199 TH/MM3 (ref 150–450)
PMV BLD AUTO: 9.3 FL (ref 7–11)
PROT SERPL-MCNC: 5.5 GM/DL (ref 6.4–8.2)
PROTHROMBIN TIME: 9.9 SEC (ref 9.8–11.6)
RBC # BLD AUTO: 2.82 MIL/MM3 (ref 4–5.3)
SODIUM SERPL-SCNC: 146 MEQ/L (ref 136–145)
WBC # BLD AUTO: 10.8 TH/MM3 (ref 4–11)

## 2018-02-28 PROCEDURE — 30233N1 TRANSFUSION OF NONAUTOLOGOUS RED BLOOD CELLS INTO PERIPHERAL VEIN, PERCUTANEOUS APPROACH: ICD-10-PCS | Performed by: INTERNAL MEDICINE

## 2018-02-28 RX ADMIN — STANDARDIZED SENNA CONCENTRATE AND DOCUSATE SODIUM SCH TAB: 8.6; 5 TABLET, FILM COATED ORAL at 09:00

## 2018-02-28 RX ADMIN — PHENYTOIN SODIUM SCH MLS/HR: 50 INJECTION INTRAMUSCULAR; INTRAVENOUS at 10:31

## 2018-02-28 RX ADMIN — PANTOPRAZOLE SODIUM SCH MLS/HR: 40 INJECTION, POWDER, FOR SOLUTION INTRAVENOUS at 18:22

## 2018-02-28 RX ADMIN — STANDARDIZED SENNA CONCENTRATE AND DOCUSATE SODIUM SCH TAB: 8.6; 5 TABLET, FILM COATED ORAL at 20:36

## 2018-02-28 RX ADMIN — HUMAN INSULIN SCH: 100 INJECTION, SOLUTION SUBCUTANEOUS at 20:36

## 2018-02-28 RX ADMIN — HUMAN INSULIN SCH: 100 INJECTION, SOLUTION SUBCUTANEOUS at 08:00

## 2018-02-28 RX ADMIN — HUMAN INSULIN SCH: 100 INJECTION, SOLUTION SUBCUTANEOUS at 17:00

## 2018-02-28 RX ADMIN — Medication SCH ML: at 09:00

## 2018-02-28 RX ADMIN — Medication SCH ML: at 20:36

## 2018-02-28 RX ADMIN — PHENYTOIN SODIUM SCH MLS/HR: 50 INJECTION INTRAMUSCULAR; INTRAVENOUS at 20:36

## 2018-02-28 RX ADMIN — PANTOPRAZOLE SODIUM SCH MLS/HR: 40 INJECTION, POWDER, FOR SOLUTION INTRAVENOUS at 05:41

## 2018-02-28 NOTE — HHI.GIFU
Subjective


Remarks


Sitting in chair comfortable, no new complain, no sign of active bleeding, NG 

tube is out and tolerating liquid diet


 (Ursula Awad MD)





Objective


Vitals I&O





Vital Signs








  Date Time  Temp Pulse Resp B/P (MAP) Pulse Ox O2 Delivery O2 Flow Rate FiO2


 


2/28/18 12:00 99.4 117 21 169/89 (115) 97   


 


2/28/18 12:00  78      


 


2/28/18 10:30  77 13  97   


 


2/28/18 10:00  78      


 


2/28/18 10:00 98.6 82 20 131/63 (85) 98   


 


2/28/18 10:00  74 21 131/63 (85) 97   


 


2/28/18 09:30  81 19  99   


 


2/28/18 09:00  79 12 125/65 (85)    


 


2/28/18 08:30  78 12     


 


2/28/18 08:00  75 13 109/59 (76)    


 


2/28/18 08:00 98.6 75 20 109/59 (76) 98   


 


2/28/18 08:00  78      


 


2/28/18 06:00  78      


 


2/28/18 06:00  78 30 115/63 (80)    


 


2/28/18 05:00  73 14 122/57 (78)    


 


2/28/18 04:00  79      


 


2/28/18 04:00 98.9 79 17 110/55 (73)    


 


2/28/18 03:00 98.5 80 13 126/60 97   


 


2/28/18 03:00  80 13 126/60 (82) 97   


 


2/28/18 02:16 98.3 84 12 110/56 80   


 


2/28/18 02:00  81 14 110/56 (74) 96   


 


2/28/18 02:00  81      


 


2/28/18 01:00  86 14 121/61 (81) 100   


 


2/28/18 00:00 98.9 80 17 106/58 (74) 100   


 


2/28/18 00:00  80      


 


2/27/18 23:16  82 25 112/59 (76) 98   


 


2/27/18 23:00  84 12  97   


 


2/27/18 22:00  96      


 


2/27/18 22:00  96 26     


 


2/27/18 21:00  79 19 107/59 (75) 99   


 


2/27/18 20:00  86      


 


2/27/18 20:00 99.3 86 20 116/59 (78) 96   


 


2/27/18 19:00  86 30 99/57 (71)    


 


2/27/18 18:00  87      


 


2/27/18 18:00  87 13 117/60 (79) 96   


 


2/27/18 17:00  92 33 107/58 (74) 99   


 


2/27/18 16:00  91      


 


2/27/18 16:00 99.0 91 22 117/56 (76) 96   














I/O      


 


 2/27/18 2/27/18 2/27/18 2/28/18 2/28/18 2/28/18





 07:00 15:00 23:00 07:00 15:00 23:00


 


Intake Total 30 ml 200 ml 1560 ml 480 ml  


 


Balance 30 ml 200 ml 1560 ml 480 ml  


 


      


 


Intake Oral 30 ml  0 ml 480 ml  


 


IV Total  100 ml 1560 ml   


 


Other  100 ml    


 


# Voids 1  3 4  


 


# Bowel Movements 1  0 0  








Laboratory





Laboratory Tests








Test


  2/27/18


18:38 2/28/18


00:26 2/28/18


05:45


 


Hemoglobin 7.3  7.0  9.2 


 


Hematocrit 21.3  20.6  26.1 


 


White Blood Count   10.8 


 


Red Blood Count   2.82 


 


Mean Corpuscular Volume   92.5 


 


Mean Corpuscular Hemoglobin   32.6 


 


Mean Corpuscular Hemoglobin


Concent 


  


  35.2 


 


 


Red Cell Distribution Width   15.1 


 


Platelet Count   199 


 


Mean Platelet Volume   9.3 


 


Neutrophils (%) (Auto)   57.5 


 


Lymphocytes (%) (Auto)   33.9 


 


Monocytes (%) (Auto)   5.5 


 


Eosinophils (%) (Auto)   2.5 


 


Basophils (%) (Auto)   0.6 


 


Neutrophils # (Auto)   6.2 


 


Lymphocytes # (Auto)   3.7 


 


Monocytes # (Auto)   0.6 


 


Eosinophils # (Auto)   0.3 


 


Basophils # (Auto)   0.1 


 


CBC Comment   DIFF FINAL 


 


Differential Comment    


 


Prothrombin Time   9.9 


 


Prothromb Time International


Ratio 


  


  1.0 


 


 


Activated Partial


Thromboplast Time 


  


  22.9 


 


 


Blood Urea Nitrogen   8 


 


Creatinine   0.55 


 


Random Glucose   82 


 


Total Protein   5.5 


 


Albumin   2.7 


 


Calcium Level   7.7 


 


Phosphorus Level   3.3 


 


Magnesium Level   1.9 


 


Alkaline Phosphatase   71 


 


Aspartate Amino Transf


(AST/SGOT) 


  


  27 


 


 


Alanine Aminotransferase


(ALT/SGPT) 


  


  15 


 


 


Total Bilirubin   0.4 


 


Sodium Level   146 


 


Potassium Level   3.7 


 


Chloride Level   116 


 


Carbon Dioxide Level   23.5 


 


Anion Gap   7 


 


Estimat Glomerular Filtration


Rate 


  


  136 


 


 


Lactic Acid Level   0.7 








Physical Exam


HEENT: Pupils round and reactive to light; normocephalic; atraumatic; no 

jaundice.  Throat is clear.


NECK: Neck is supple, no JVD, no lymphadenopathy.


CHEST:  Chest is clear to auscultation and percussion.


CARDIAC:  Regular rate and rhythm with no murmur gallop or rubs.


ABDOMEN:  Soft, nondistended, nontender; no hepatosplenomegaly; bowel sounds 

are present in all four quadrants.


EXTREMITIES: No clubbing, cyanosis, or edema.


SKIN:  Normal; no rash; no jaundice.


CNS:  No focal deficits; alert and oriented times three.


 (Miriam Toledo)





Assessment and Plan


Plan


Assessment:


- Anemia secondary to coffee ground emesis- S/P EGD this morning --> Large 

Cassandra-Miller tear 


  in the EG junction with clot attached to it, cauterized to control bleeding. 

Severe gastritis, biopsy 


  taken. Duodenitis.  H/H currently stable 9.5/27.5, she has not been 

transfused.  CT abdomen and 


  pelvis W IV contrast (2/26) --> Fatty liver. No acute findings within the 

abdomen and pelvis.  Currently


  on Protonix gtt, can be discontinued and switched to Protonix PO





2/28/18  s/pd 1 x prbc and hgb up to 9.  no active bleeding.  pt with non 

complaints.





Plan:


soft diet


await bx


Monitor CBC 


transfuse as needed


Notify GI of active bleeding


Protonix PO


Avoid NSAIDS


Avoid ETOH


 


Pt has been seen and examined by myself and Dr. Awad and this note is 

written on his behalf 


 (Miriam Toledo)


Plan


Him in, agree with above Notes, no sign of active bleeding, feeling okay, 

patient was instructed to avoid alcohol and will continue PPI


 (Ursula Awad MD)











Miriam Toledo Feb 28, 2018 15:14


Ursula Awad MD Feb 28, 2018 19:26

## 2018-02-28 NOTE — HHI.PR
Subjective


Remarks


The patient denies any further hematemesis.


Denies abdominal pain, nausea or vomiting.


Vital signs stable.





Objective


Vitals





Vital Signs








  Date Time  Temp Pulse Resp B/P (MAP) Pulse Ox O2 Delivery O2 Flow Rate FiO2


 


2/28/18 14:00 99.4 117 21 169/89 (115) 97   


 


2/28/18 14:00  78      


 


2/28/18 12:00 99.4 117 21 169/89 (115) 97   


 


2/28/18 12:00  78      


 


2/28/18 10:30  77 13  97   


 


2/28/18 10:00  78      


 


2/28/18 10:00 98.6 82 20 131/63 (85) 98   


 


2/28/18 10:00  74 21 131/63 (85) 97   


 


2/28/18 09:30  81 19  99   


 


2/28/18 09:00  79 12 125/65 (85)    


 


2/28/18 08:30  78 12     


 


2/28/18 08:00  75 13 109/59 (76)    


 


2/28/18 08:00 98.6 75 20 109/59 (76) 98   


 


2/28/18 08:00  78      


 


2/28/18 06:00  78      


 


2/28/18 06:00  78 30 115/63 (80)    


 


2/28/18 05:00  73 14 122/57 (78)    


 


2/28/18 04:00  79      


 


2/28/18 04:00 98.9 79 17 110/55 (73)    


 


2/28/18 03:00 98.5 80 13 126/60 97   


 


2/28/18 03:00  80 13 126/60 (82) 97   


 


2/28/18 02:16 98.3 84 12 110/56 80   


 


2/28/18 02:00  81 14 110/56 (74) 96   


 


2/28/18 02:00  81      


 


2/28/18 01:00  86 14 121/61 (81) 100   


 


2/28/18 00:00 98.9 80 17 106/58 (74) 100   


 


2/28/18 00:00  80      


 


2/27/18 23:16  82 25 112/59 (76) 98   


 


2/27/18 23:00  84 12  97   


 


2/27/18 22:00  96      


 


2/27/18 22:00  96 26     


 


2/27/18 21:00  79 19 107/59 (75) 99   


 


2/27/18 20:00  86      


 


2/27/18 20:00 99.3 86 20 116/59 (78) 96   


 


2/27/18 19:00  86 30 99/57 (71)    


 


2/27/18 18:00  87      


 


2/27/18 18:00  87 13 117/60 (79) 96   














I/O      


 


 2/27/18 2/27/18 2/27/18 2/28/18 2/28/18 2/28/18





 07:00 15:00 23:00 07:00 15:00 23:00


 


Intake Total 30 ml 200 ml 1560 ml 480 ml  


 


Balance 30 ml 200 ml 1560 ml 480 ml  


 


      


 


Intake Oral 30 ml  0 ml 480 ml  


 


IV Total  100 ml 1560 ml   


 


Other  100 ml    


 


# Voids 1  3 4  


 


# Bowel Movements 1  0 0  








Result Diagram:  


2/28/18 0545                                                                   

             2/28/18 0545





Imaging





Last Impressions








Chest X-Ray 2/27/18 0000 Signed





Impressions: 





 Service Date/Time:  Tuesday, February 27, 2018 09:23 - CONCLUSION:  No acute 





 cardiopulmonary abnormality is identified.     Daniel Bee MD 








Objective Remarks


Awake and alert oriented 3.


NAD.


Lungs are clear bilaterally.


Abdomen is soft, nontender nondistended.


No edema in lower extremities.


Medications and IVs





Current Medications








 Medications


  (Trade)  Dose


 Ordered  Sig/Rishi


 Route  Start Time


 Stop Time Status Last Admin


 


 Sodium Chloride  1,000 ml @ 


 100 mls/hr  Q10H


 IV  2/27/18 00:48


    2/27/18 22:09


 


 


  (NS Flush)  2 ml  UNSCH  PRN


 IV FLUSH  2/27/18 01:00


     


 


 


  (NS Flush)  2 ml  BID


 IV FLUSH  2/27/18 09:00


    2/27/18 20:02


 


 


  (Dilaudid Pf Inj)  1 mg  Q4H  PRN


 IV  2/27/18 01:00


     


 


 


  (Zofran Inj)  4 mg  Q6H  PRN


 IV PUSH  2/27/18 01:00


     


 


 


  (Restoril)  15 mg  HS  PRN


 PO  2/27/18 01:00


     


 


 


  (Duoneb Neb)  1 ampule  Q2HR NEB  PRN


 INH  2/27/18 01:00


     


 


 


 Miscellaneous


 Information  1  Q361D


 XX  2/27/18 01:00


    2/27/18 01:00


 


 


  (Chlorhexidine


 2% Cloth)  3 pack


 Taper  DAILY@04


 TOP  2/27/18 04:00


 2/23/19 03:59  2/27/18 22:10


 


 


  (Chlorhexidine


 2% Cloth)  3 pack  UNSCH  PRN


 TOP  2/27/18 01:00


     


 


 


  (Carla-Colace)  1 tab  BID


 PO  2/27/18 09:00


     


 


 


  (Milk Of


 Magnesia Liq)  30 ml  Q12H  PRN


 PO  2/27/18 01:00


     


 


 


  (Senokot)  17.2 mg  Q12H  PRN


 PO  2/27/18 01:00


     


 


 


  (Dulcolax Supp)  10 mg  DAILY  PRN


 RECTAL  2/27/18 01:00


     


 


 


  (Lactulose Liq)  30 ml  DAILY  PRN


 PO  2/27/18 01:00


     


 


 


 Potassium Chloride  100 ml @ 


 50 mls/hr  Q2H  PRN


 IV  2/27/18 03:15


    2/27/18 05:05


 


 


 Potassium Chloride  100 ml @ 


 50 mls/hr  Q2H  PRN


 IV  2/27/18 03:15


     


 


 


  (K-Lyte Cl  Eff)  50 meq  UNSCH  PRN


 PO  2/27/18 03:15


     


 


 


 Potassium Chloride  100 ml @ 


 25 mls/hr  UNSCH  PRN


 IV  2/27/18 03:15


     


 


 


 Potassium Chloride  100 ml @ 


 50 mls/hr  Q2H  PRN


 IV  2/27/18 03:15


     


 


 


 Magnesium Sulfate


 4 gm/Sodium


 Chloride  100 ml @ 


 50 mls/hr  UNSCH  PRN


 IV  2/27/18 03:15


     


 


 


  (Mag-Ox)  800 mg  UNSCH  PRN


 PO  2/27/18 03:15


     


 


 


 Magnesium Sulfate


 2 gm/Sodium


 Chloride  100 ml @ 


 50 mls/hr  UNSCH  PRN


 IV  2/27/18 03:15


     


 


 


  (K-Phos)  2,000 mg  Q4H  PRN


 PO  2/27/18 03:15


     


 


 


 Sodium Phosphate


 30 mmol/Sodium


 Chloride  250 ml @ 


 42 mls/hr  UNSCH  PRN


 IV  2/27/18 03:15


     


 


 


  (K-Phos)  2,000 mg  UNSCH  PRN


 PO/TUBE  2/27/18 03:15


     


 


 


 Potassium


 Phosphate 30 mmol/


 Sodium Chloride  260 ml @ 


 42 mls/hr  UNSCH  PRN


 IV  2/27/18 03:15


     


 


 


 Pantoprazole


 Sodium 80 mg/


 Sodium Chloride  100 ml @ 


 10 mls/hr  Q10H


 IV  2/27/18 09:00


    2/28/18 05:41


 


 


 Acetaminophen  100 ml @ 


 400 mls/hr  Q8H  PRN


 IV  2/27/18 09:00


     


 


 


  (D50w (Vial) Inj)  50 ml  UNSCH  PRN


 IV PUSH  2/27/18 08:30


     


 


 


  (Glucagon Inj)  1 mg  UNSCH  PRN


 OTHER  2/27/18 08:30


     


 


 


  (NovoLIN R


 SUPPLEMENTAL


 SCALE)  1  ACHS SLIDING  SCALE


 SQ  2/27/18 12:00


     


 











A/P


Assessment and Plan


Neuro/Psych:





UDS + cocaine 2/26/18


 


Ofirmev 1 g IV every 8 hours as needed fever/pain 1 through 5


Hydromorphone 1 mg IV every 4 hours as needed pain 6-10


Denies EtOH use





CV: 





Sinus tachycardia


History of hypertension





Avoid beta-blockers acutely -UDS positive for cocaine


Currently on normal saline at 100 cc an hour


Not requiring vasopressors and/or antihypertensives currently


Follow-up on EKG


2/28 sinus tachycardia has resolved.





Resp: 





Nasal cannula to make to maintain saturations greater than or equal to 92%.  

Currently in room air


Incentives parameter while awake


Check chest x-ray


2/28 chest x-ray negative for acute process.





GI: 





Hematemesis


Hypoalbuminemia 





Initially placed n.p.o.


Pantoprazole drip currently at 8 mg an hour for GI prophylaxis he received 80 

mg bolus 1


Currently docusate sodium/senna 1 tablet twice daily for bowel regimen


2/28 GI consulted.  Patient status post EGD with findings of Cassandra-Miller 

tear.  Continue PPI.





:  





No indication for Guidry catheter





Endo:  





Sliding scale insulin Novulin R low regimen with Accu-Cheks to maintain 

euglycemia





Renal: 





Prerenal azotemia





Aggressive hydration completed


Creatinine currently within normal limits


Monitor urine output


Accurate I's and O's





Heme:





Leukocytosis


Normocytic anemia





Monitor CBC daily.  Follow trends.


Serial hemoglobins every 6 hours


PTT normal, PTT 19 on admission





ID:





Monitor for infection





FEN:





Electrolyte abnormalities.





Replace electrolytes as clinically indicated per ICU electrolyte protocol


Recheck potassium at 1200


2 g mag sulfate IV 1 now


2/28 hypokalemia resolved.  Continue to monitor BMP.





MSK:





PT evaluate and treat





Access


-Utilized peripheral IV.  Central and if indicated





Prophylaxis


-GI -pantoprazole drip


-DVT -SCD/pharmacological prophylaxis contraindicated with possible upper GI 

bleed/acute


Discharge Planning


Okay to transfer to the medical floor.











Juan Diego Cai MD Feb 28, 2018 17:15

## 2018-03-01 VITALS
TEMPERATURE: 99 F | HEART RATE: 73 BPM | RESPIRATION RATE: 13 BRPM | OXYGEN SATURATION: 95 % | DIASTOLIC BLOOD PRESSURE: 67 MMHG | SYSTOLIC BLOOD PRESSURE: 147 MMHG

## 2018-03-01 VITALS
SYSTOLIC BLOOD PRESSURE: 146 MMHG | OXYGEN SATURATION: 100 % | TEMPERATURE: 98.7 F | RESPIRATION RATE: 13 BRPM | HEART RATE: 70 BPM | DIASTOLIC BLOOD PRESSURE: 65 MMHG

## 2018-03-01 VITALS — SYSTOLIC BLOOD PRESSURE: 111 MMHG | HEART RATE: 71 BPM | RESPIRATION RATE: 18 BRPM | DIASTOLIC BLOOD PRESSURE: 72 MMHG

## 2018-03-01 VITALS — SYSTOLIC BLOOD PRESSURE: 102 MMHG | HEART RATE: 84 BPM | DIASTOLIC BLOOD PRESSURE: 63 MMHG | RESPIRATION RATE: 42 BRPM

## 2018-03-01 VITALS — RESPIRATION RATE: 24 BRPM | HEART RATE: 79 BPM

## 2018-03-01 VITALS
DIASTOLIC BLOOD PRESSURE: 55 MMHG | RESPIRATION RATE: 14 BRPM | HEART RATE: 70 BPM | OXYGEN SATURATION: 96 % | SYSTOLIC BLOOD PRESSURE: 109 MMHG

## 2018-03-01 LAB
BUN SERPL-MCNC: 3 MG/DL (ref 7–18)
CALCIUM SERPL-MCNC: 8.5 MG/DL (ref 8.5–10.1)
CHLORIDE SERPL-SCNC: 109 MEQ/L (ref 98–107)
CREAT SERPL-MCNC: 0.54 MG/DL (ref 0.5–1)
ERYTHROCYTE [DISTWIDTH] IN BLOOD BY AUTOMATED COUNT: 14.6 % (ref 11.6–17.2)
GFR SERPLBLD BASED ON 1.73 SQ M-ARVRAT: 138 ML/MIN (ref 89–?)
GLUCOSE SERPL-MCNC: 87 MG/DL (ref 74–106)
HCO3 BLD-SCNC: 25 MEQ/L (ref 21–32)
HCT VFR BLD CALC: 26.7 % (ref 35–46)
HGB BLD-MCNC: 9.4 GM/DL (ref 11.6–15.3)
MCH RBC QN AUTO: 32.4 PG (ref 27–34)
MCHC RBC AUTO-ENTMCNC: 35.2 % (ref 32–36)
MCV RBC AUTO: 92.2 FL (ref 80–100)
PLATELET # BLD: 205 TH/MM3 (ref 150–450)
PMV BLD AUTO: 9.6 FL (ref 7–11)
RBC # BLD AUTO: 2.89 MIL/MM3 (ref 4–5.3)
SODIUM SERPL-SCNC: 143 MEQ/L (ref 136–145)
WBC # BLD AUTO: 9.9 TH/MM3 (ref 4–11)

## 2018-03-01 RX ADMIN — HUMAN INSULIN SCH: 100 INJECTION, SOLUTION SUBCUTANEOUS at 11:31

## 2018-03-01 RX ADMIN — HUMAN INSULIN SCH: 100 INJECTION, SOLUTION SUBCUTANEOUS at 08:00

## 2018-03-01 RX ADMIN — PANTOPRAZOLE SODIUM SCH MLS/HR: 40 INJECTION, POWDER, FOR SOLUTION INTRAVENOUS at 02:31

## 2018-03-01 RX ADMIN — STANDARDIZED SENNA CONCENTRATE AND DOCUSATE SODIUM SCH TAB: 8.6; 5 TABLET, FILM COATED ORAL at 08:46

## 2018-03-01 RX ADMIN — Medication SCH ML: at 08:46

## 2018-03-01 RX ADMIN — PHENYTOIN SODIUM SCH MLS/HR: 50 INJECTION INTRAMUSCULAR; INTRAVENOUS at 05:43

## 2018-03-01 RX ADMIN — CHLORHEXIDINE GLUCONATE SCH PACK: 500 CLOTH TOPICAL at 02:31

## 2018-03-01 NOTE — HHI.DS
__________________________________________________





Discharge Summary


Admission Date


Feb 26, 2018 at 22:30


Discharge Date:  Mar 1, 2018


Admitting Diagnosis


Hematemesis, hypotension, tachycardia





(1) Gastritis


ICD Code:  K29.70 - Gastritis, unspecified, without bleeding


Diagnosis:  Principal


Status:  Acute


(2) Acute blood loss anemia


ICD Code:  D62 - Acute posthemorrhagic anemia


Diagnosis:  Principal


Status:  Acute


(3) Cassandra-Chu tear


ICD Code:  K22.6 - Gastro-esophageal laceration-hemorrhage syndrome


Diagnosis:  Principal


Status:  Acute


(4) GI bleed


ICD Code:  K92.2 - Gastrointestinal hemorrhage, unspecified


Diagnosis:  Principal


Status:  Resolved


Procedures


Status post panendoscopy with biopsy and cautery.


Brief History - From Admission


62-year-old female with a history of hypertension and one episode of presumed 

upper GI bleed noted as hematemesis approximately years ago that was self-

limited, patient did not seek medical attention for, presents with having 

vomiting copious amounts of dark material. The patient surmises as blood, 

similar to previous episode noted above.  At presentation at the [facility] the 

emergency department, patient was tachycardic with hypotension in triage.  

During my assessment in the ICU the patient still remained slightly tachycardic 

but normotensive with map at 100s .  The patient denies having chest pain, 

shortness of breath, abdominal pain, having any melena or hematochezia 

recently.  Patient denies fever chills sweats, recent antibiotic use, or any 

recent illnesses.  Patient denies any history of any bleeding dyscrasias or 

family history of same.  Patient does not take any blood thinners does not use 

NSAIDs on a regular basis.  Patient states she infrequently has 1 drink of 

alcohol.


CBC/BMP:  


3/1/18 0310                                                                    

            3/1/18 0310





Significant Findings





Laboratory Tests








Test


  2/26/18


22:45 2/27/18


01:44 2/27/18


05:10 2/27/18


08:44


 


White Blood Count


  


  15.1 TH/MM3


(4.0-11.0) 


  12.0 TH/MM3


(4.0-11.0)


 


Red Blood Count


  


  2.93 MIL/MM3


(4.00-5.30) 


  2.58 MIL/MM3


(4.00-5.30)


 


Hemoglobin


  


  9.5 GM/DL


(11.6-15.3) 


  8.5 GM/DL


(11.6-15.3)


 


Hematocrit


  


  27.5 %


(35.0-46.0) 


  24.4 %


(35.0-46.0)


 


Neutrophils (%) (Auto)


  


  72.1 %


(16.0-70.0) 


  


 


 


Neutrophils # (Auto)


  


  10.9 TH/MM3


(1.8-7.7) 


  


 


 


Blood Urea Nitrogen


  


  37 MG/DL


(7-18) 


  


 


 


Random Glucose


  


  140 MG/DL


() 


  


 


 


Total Protein


  


  6.1 GM/DL


(6.4-8.2) 


  


 


 


Albumin


  


  2.9 GM/DL


(3.4-5.0) 


  


 


 


Calcium Level


  


  7.7 MG/DL


(8.5-10.1) 


  


 


 


Potassium Level


  


  3.0 MEQ/L


(3.5-5.1) 


  


 


 


Chloride Level


  


  111 MEQ/L


() 


  


 


 


Estimat Glomerular Filtration


Rate 


  80 ML/MIN


(>89) 


  


 


 


Activated Partial


Thromboplast Time 


  


  


  22.4 SEC


(24.3-30.1)


 


Phosphorus Level


  


  


  


  2.3 MG/DL


(2.5-4.9)


 


Test


  2/27/18


13:12 2/27/18


18:38 2/28/18


00:26 2/28/18


05:45


 


Hemoglobin


  8.0 GM/DL


(11.6-15.3) 7.3 GM/DL


(11.6-15.3) 7.0 GM/DL


(11.6-15.3) 9.2 GM/DL


(11.6-15.3)


 


Hematocrit


  23.3 %


(35.0-46.0) 21.3 %


(35.0-46.0) 20.6 %


(35.0-46.0) 26.1 %


(35.0-46.0)


 


Potassium Level


  3.4 MEQ/L


(3.5-5.1) 


  


  


 


 


Red Blood Count


  


  


  


  2.82 MIL/MM3


(4.00-5.30)


 


Activated Partial


Thromboplast Time 


  


  


  22.9 SEC


(24.3-30.1)


 


Total Protein


  


  


  


  5.5 GM/DL


(6.4-8.2)


 


Albumin


  


  


  


  2.7 GM/DL


(3.4-5.0)


 


Calcium Level


  


  


  


  7.7 MG/DL


(8.5-10.1)


 


Sodium Level


  


  


  


  146 MEQ/L


(136-145)


 


Chloride Level


  


  


  


  116 MEQ/L


()


 


Test


  3/1/18


03:10 


  


  


 


 


Red Blood Count


  2.89 MIL/MM3


(4.00-5.30) 


  


  


 


 


Hemoglobin


  9.4 GM/DL


(11.6-15.3) 


  


  


 


 


Hematocrit


  26.7 %


(35.0-46.0) 


  


  


 


 


Blood Urea Nitrogen 3 MG/DL (7-18)    


 


Potassium Level


  3.2 MEQ/L


(3.5-5.1) 


  


  


 


 


Chloride Level


  109 MEQ/L


() 


  


  


 








Imaging





Last Impressions








Chest X-Ray 2/27/18 0000 Signed





Impressions: 





 Service Date/Time:  Tuesday, February 27, 2018 09:23 - CONCLUSION:  No acute 





 cardiopulmonary abnormality is identified.     Daniel Bee MD 








PE at Discharge


Awake and alert oriented 3.


NAD.


Lungs are clear bilaterally.


Abdomen is soft, nontender nondistended.


No edema in lower extremities.


Pt update on day of discharge


The patient denies any abdominal pain, nausea or vomiting.  Denies melanotic 

stools or hematochezia.  Patient is tolerating diet.


Hospital Course


The patient was admitted to the intensive care unit given hypotension.  Patient 

was treated with IV fluids, however did not require vasopressors.  Sinus 

tachycardia resolved.  Patient also was positive for cocaine in urine drug 

screen.  Patient was admitted with hematemesis for which GI was consulted.  

Patient also had acute blood loss anemia secondary to GI bleed.  GI consulted.  

The patient underwent EGD with findings of Cassandra-Chu tear and gastritis 

treated with cautery.  Patient also had biopsies during EGD. WBC was elevated 

likely secondary to stress, WBC came down to normal.  Potassium was found to be 

low and replaced orally during hospital stay.  The patient was discharged with 

instructions to follow-up with gastroenterology in 2 weeks, discharged on a 

proton pump inhibitor.


Pt Condition on Discharge:  Stable


Discharge Disposition:  Discharge Home


Discharge Time:  <= 30 minutes


Discharge Instructions


DIET: Follow Instructions for:  Heart Healthy Diet


Activities you can perform:  Regular-No Restrictions


Follow up Referrals:  


Gastroenterology - 2 Weeks with Ursula Awad MD





New Medications:  


Pantoprazole (Pantoprazole) 40 Mg Tab


40 MG PO DAILY for Cassandra chu tear, #30 TAB





 


Continued Medications:  


[BP MED x2] ()  




















Juan Diego Cai MD Mar 1, 2018 11:44

## 2018-03-01 NOTE — PQ
Physician Query Response Document

 

 

PATIENT:               VALERY MARIN

ACCT #:                  [id number]

MRN:                       [id number]

:                       1955

ADMIT DATE:       2018 10:30 PM

DISCH DATE:

RESPONDING

PROVIDER #:        [email or web address]

 

 

QUERY TEXT:

 

Anemia Type

 

Acute blood loss anemia in the setting of Cassandra-Miller Tear requiring treatment w/ cautery and trans
fusions

Other explanation of clinical findings.

Unable to determine (no explanation for clinical findings).

 

 

 

The patient's Clinical Indicators include:

The medical record reflects the following clinical findings, treatment, and risk factors.

* Clinical Indicators H

* Risk Factors Cassandra Miller tear

* Treatment Gi consult, EGD w/ cautery of tear/bleed, transfusions

Please clarify and document your clinical opinion in the progress notes and discharge summary includi
ng the definitive and/or presumptive diagnosis (suspected or probable), related to the above clinical
 findings. Please include clinical findings supporting your diagnosis.

Thank you, Ximena Mora CDS:  Contact Number: CDS/RN [phone]. [phone]

 

 

 

 

 

 

 

Query created by: Ximena Mora on 2018 1:54 PM

 

RESPONSE TEXT:

 

Acute blood loss anemia in the setting of Cassandra Miller tear requiring treatment with cautery and blo
od transfusion.

 

 

 

 

 

 

 

Electronically signed by:  Juan Diego Burgess MD 3/1/2018 11:31 AM

## 2018-03-01 NOTE — HHI.DCPOC
Discharge Care Plan


Diagnosis:  


(1) GI bleed


(2) Acute blood loss anemia


(3) Cassandra-Miller tear


(4) Gastritis


Goals to Promote Your Health


* To prevent worsening of your condition and complications


* To maintain your health at the optimal level


Directions to Meet Your Goals


*** Take your medications as prescribed


*** Follow your dietary instruction


*** Follow activity as directed








*** Keep your appointments as scheduled


*** Take your immunizations and boosters as scheduled


*** If your symptoms worsen call your PCP, if no PCP go to Urgent Care Center 

or Emergency Room***


*** Smoking is Dangerous to Your Health. Avoid second hand smoke***


***Call the 24-hour hour crisis hotline for domestic abuse at 1-728.422.8079***











Juan Diego Cai MD Mar 1, 2018 11:35

## 2018-03-01 NOTE — HHI.GIFU
Subjective


Remarks


Pt OOB to chair, ready to go home.  Asking for immodium for some loose stool.  

No bleeding.


 (Miriam Toledo)





Objective


Vitals I&O





Vital Signs








  Date Time  Temp Pulse Resp B/P (MAP) Pulse Ox O2 Delivery O2 Flow Rate FiO2


 


3/1/18 04:00  70      


 


3/1/18 04:00 98.7 70 13 146/65 (92) 100   


 


3/1/18 00:00 99.0 73 13 147/67 (93) 95   


 


3/1/18 00:00  73      


 


2/28/18 23:00  73 12 124/58 (80) 100   


 


2/28/18 22:00  74      


 


2/28/18 22:00  74 14 125/60 (81) 100   


 


2/28/18 21:00  73 15 121/56 (77) 98   


 


2/28/18 20:00  78      


 


2/28/18 20:00  78 16 124/55 (78) 99   


 


2/28/18 19:00 98.8 77 19 127/60 (82) 98   


 


2/28/18 18:00  78      


 


2/28/18 18:00 99.4 78 21 127/60 (82) 97   


 


2/28/18 16:00  78      


 


2/28/18 16:00 99.4 117 21 169/89 (115) 97   


 


2/28/18 14:00 99.4 117 21 169/89 (115) 97   


 


2/28/18 14:00  78      


 


2/28/18 12:00 99.4 117 21 169/89 (115) 97   


 


2/28/18 12:00  78      














I/O      


 


 2/28/18 2/28/18 2/28/18 3/1/18 3/1/18 3/1/18





 07:00 15:00 23:00 07:00 15:00 23:00


 


Intake Total 480 ml  1750 ml 1230 ml  


 


Output Total   850 ml   


 


Balance 480 ml  900 ml 1230 ml  


 


      


 


Intake Oral 480 ml  750 ml 100 ml  


 


IV Total   1000 ml 1130 ml  


 


Output Urine Total   850 ml   


 


# Voids 4  5 12  


 


# Bowel Movements 0   0  








Laboratory





Laboratory Tests








Test


  3/1/18


03:10


 


White Blood Count 9.9 


 


Red Blood Count 2.89 


 


Hemoglobin 9.4 


 


Hematocrit 26.7 


 


Mean Corpuscular Volume 92.2 


 


Mean Corpuscular Hemoglobin 32.4 


 


Mean Corpuscular Hemoglobin


Concent 35.2 


 


 


Red Cell Distribution Width 14.6 


 


Platelet Count 205 


 


Mean Platelet Volume 9.6 


 


Blood Urea Nitrogen 3 


 


Creatinine 0.54 


 


Random Glucose 87 


 


Calcium Level 8.5 


 


Sodium Level 143 


 


Potassium Level 3.2 


 


Chloride Level 109 


 


Carbon Dioxide Level 25.0 


 


Anion Gap 9 


 


Estimat Glomerular Filtration


Rate 138 


 








Imaging





Last Impressions








Chest X-Ray 2/27/18 0000 Signed





Impressions: 





 Service Date/Time:  Tuesday, February 27, 2018 09:23 - CONCLUSION:  No acute 





 cardiopulmonary abnormality is identified.     Daniel Bee MD 








Physical Exam


HEENT: PERRL; normocephalic; atraumatic; no jaundice.   


CHEST:  CTA


CARDIAC:  RRR


ABDOMEN:  Soft, nondistended, nontender; no hepatosplenomegaly; bowel sounds 

are present in all four quadrants.


EXTREMITIES: No clubbing, cyanosis, or edema.


SKIN:  Normal; no rash; no jaundice.


CNS:  No focal deficits; alert and oriented times three.


 (Miriam Toledo)





Assessment and Plan


Plan


Assessment:


- Anemia secondary to coffee ground emesis- S/P EGD this morning --> Large 

Cassandra-Miller tear 


  in the EG junction with clot attached to it, cauterized to control bleeding. 

Severe gastritis, biopsy 


  taken. Duodenitis.  H/H currently stable 9.5/27.5, she has not been 

transfused.  CT abdomen and 


  pelvis W IV contrast (2/26) --> Fatty liver. No acute findings within the 

abdomen and pelvis.  Currently


  on Protonix gtt, can be discontinued and switched to Protonix PO





2/28/18  s/pd 1 x prbc and hgb up to 9.  no active bleeding.  pt with non 

complaints.


3/1/18    no bleeding.  some loose stool.  HH stable.  path gastritis.





Plan:


immodium PRN


MELL


Protonix PO


Avoid NSAIDS


Avoid ETOH


F/U with GI after d/c


ok to d/c from GI standpoint


pt seen by myself and Dr Awad and this note is on his behalf


 (Miriam Toledo)


Plan


Patient was seen and examined, agree with above-noted, feeling better, 

hemoglobin stable, she wants to go home, I encouraged her to follow up with the 

gastroenterologist in her home town to have a colonoscopy done to address her 

diarrhea which could be irritable bowel syndrome but the patient did not have 

colonoscopy in the past and that should be done with her age she has verbalized 

understanding


 (Ursula Awad MD)











Miriam Toledo Mar 1, 2018 10:42


Ursula Awad MD Mar 1, 2018 17:22